# Patient Record
Sex: MALE | Race: WHITE | NOT HISPANIC OR LATINO
[De-identification: names, ages, dates, MRNs, and addresses within clinical notes are randomized per-mention and may not be internally consistent; named-entity substitution may affect disease eponyms.]

---

## 2021-10-27 ENCOUNTER — FORM ENCOUNTER (OUTPATIENT)
Age: 66
End: 2021-10-27

## 2022-04-21 ENCOUNTER — FORM ENCOUNTER (OUTPATIENT)
Age: 67
End: 2022-04-21

## 2022-04-22 VITALS
TEMPERATURE: 97.5 F | HEIGHT: 70 IN | DIASTOLIC BLOOD PRESSURE: 82 MMHG | SYSTOLIC BLOOD PRESSURE: 128 MMHG | BODY MASS INDEX: 32.21 KG/M2 | HEART RATE: 70 BPM | WEIGHT: 225 LBS

## 2022-08-23 ENCOUNTER — NON-APPOINTMENT (OUTPATIENT)
Age: 67
End: 2022-08-23

## 2022-08-23 DIAGNOSIS — Z78.9 OTHER SPECIFIED HEALTH STATUS: ICD-10-CM

## 2022-08-23 DIAGNOSIS — Z85.038 PERSONAL HISTORY OF OTHER MALIGNANT NEOPLASM OF LARGE INTESTINE: ICD-10-CM

## 2022-08-23 DIAGNOSIS — Z80.9 FAMILY HISTORY OF MALIGNANT NEOPLASM, UNSPECIFIED: ICD-10-CM

## 2022-08-23 RX ORDER — VALSARTAN AND HYDROCHLOROTHIAZIDE 320; 25 MG/1; MG/1
320-25 TABLET, FILM COATED ORAL DAILY
Refills: 0 | Status: ACTIVE | COMMUNITY

## 2022-08-23 RX ORDER — ATORVASTATIN CALCIUM 10 MG/1
10 TABLET, FILM COATED ORAL
Refills: 0 | Status: ACTIVE | COMMUNITY

## 2022-10-11 ENCOUNTER — APPOINTMENT (OUTPATIENT)
Dept: CARDIOLOGY | Facility: CLINIC | Age: 67
End: 2022-10-11

## 2022-10-11 VITALS — BODY MASS INDEX: 32.27 KG/M2 | HEIGHT: 70 IN | WEIGHT: 225.44 LBS

## 2022-10-11 PROCEDURE — 99205 OFFICE O/P NEW HI 60 MIN: CPT

## 2022-10-12 ENCOUNTER — NON-APPOINTMENT (OUTPATIENT)
Age: 67
End: 2022-10-12

## 2022-12-27 ENCOUNTER — NON-APPOINTMENT (OUTPATIENT)
Age: 67
End: 2022-12-27

## 2023-02-06 ENCOUNTER — APPOINTMENT (OUTPATIENT)
Dept: CARDIOLOGY | Facility: CLINIC | Age: 68
End: 2023-02-06
Payer: MEDICARE

## 2023-02-06 PROCEDURE — 93925 LOWER EXTREMITY STUDY: CPT

## 2023-02-06 PROCEDURE — 93306 TTE W/DOPPLER COMPLETE: CPT

## 2023-03-13 NOTE — DISCUSSION/SUMMARY
[FreeTextEntry1] : exercise for HDL \par claudication get arterial u/s as mild diminished dps \par get 2 decho \par f/u in 6 months \par get bloodwork

## 2023-03-13 NOTE — HISTORY OF PRESENT ILLNESS
[FreeTextEntry1] : PT WITH HTN, HLD, OBESITY, GRADE 1 DIASTOLIC DYSFUNCTION, ABN STRESS EKG, NORMAL NUCLEAR . \par CUFF ACCURATE WITH OFFICE. \par 21: EXERCISE STRESS NUCLEAR: 2 mm st depression inferolateral only exercised 5:30, HTN WITH EXERCISE. nuclear is negative for ischemia. \par \par T was on diet control from last visit \par \par 10/11/22: pt TG now improved to 132 with diet control but HDL increased, pt denies cp, pt walking with dog, sob only if going up a hill. \par pt complains of pain in calves periodically. pt had flu shot last week with some issues. \par pt HAD COUGH WITH ACE I BUT NO PROBLEMS DIOVAN \par \par 3/13/23: Pt's t from 132. Discussed diet control with patient as patient admits to eating a lot of pasta neelam bread will recheck blood work at next visit.

## 2023-03-13 NOTE — PHYSICAL EXAM
[Normal Venous Pressure] : normal venous pressure [Normal S1, S2] : normal S1, S2 [Clear Lung Fields] : clear lung fields [Soft] : abdomen soft [de-identified] : RRR [de-identified] : DIMINISHED PULSES DP

## 2023-05-22 ENCOUNTER — APPOINTMENT (OUTPATIENT)
Dept: CARDIOLOGY | Facility: CLINIC | Age: 68
End: 2023-05-22
Payer: MEDICARE

## 2023-05-22 VITALS — HEART RATE: 74 BPM | SYSTOLIC BLOOD PRESSURE: 130 MMHG | DIASTOLIC BLOOD PRESSURE: 78 MMHG

## 2023-05-22 VITALS — WEIGHT: 223 LBS | HEIGHT: 70 IN | BODY MASS INDEX: 31.92 KG/M2

## 2023-05-22 PROCEDURE — 99214 OFFICE O/P EST MOD 30 MIN: CPT

## 2023-05-22 NOTE — CARDIOLOGY SUMMARY
[de-identified] : 2/6/23: DD1, EF: 63%, mild MR, aorta 3.7cm [de-identified] : 2/6/23: mild diffuse atherosclerosis noted in b/l le

## 2023-05-22 NOTE — HISTORY OF PRESENT ILLNESS
[FreeTextEntry1] : PT WITH HTN, HLD, OBESITY, GRADE 1 DIASTOLIC DYSFUNCTION, ABN STRESS EKG, NORMAL NUCLEAR . \par CUFF ACCURATE WITH OFFICE. \par 21: EXERCISE STRESS NUCLEAR: 2 mm st depression inferolateral only exercised 5:30, HTN WITH EXERCISE. nuclear is negative for ischemia. \par \par T was on diet control from last visit \par \par 10/11/22: pt TG now improved to 132 with diet control but HDL increased, pt denies cp, pt walking with dog, sob only if going up a hill. \par pt complains of pain in calves periodically. pt had flu shot last week with some issues. \par pt HAD COUGH WITH ACE I BUT NO PROBLEMS DIOVAN \par \par 3/13/23: Pt's t from 132. Discussed diet control with patient as patient admits to eating a lot of pasta neelam bread will recheck blood work at next visit. \par \par 23:\par 23: DD1, EF: 63%, mild MR, aorta 3.7cm\par 23: mild diffuse atherosclerosis noted in b/l le \par pt lost 4 lbs, pt says cut down carbs, pt with mild atherosclerosis, pt had elevated TG and die control. pt has pain in his heel with walking at time, and pt complaints of pain in his back.

## 2023-05-22 NOTE — PHYSICAL EXAM
[Normal Venous Pressure] : normal venous pressure [Normal S1, S2] : normal S1, S2 [Clear Lung Fields] : clear lung fields [Soft] : abdomen soft [de-identified] : RRR [de-identified] : DIMINISHED PULSES DP

## 2023-09-09 ENCOUNTER — EMERGENCY (EMERGENCY)
Facility: HOSPITAL | Age: 68
LOS: 0 days | Discharge: HOME | End: 2023-09-09
Attending: STUDENT IN AN ORGANIZED HEALTH CARE EDUCATION/TRAINING PROGRAM
Payer: MEDICARE

## 2023-09-09 VITALS
RESPIRATION RATE: 18 BRPM | HEART RATE: 82 BPM | SYSTOLIC BLOOD PRESSURE: 195 MMHG | OXYGEN SATURATION: 98 % | DIASTOLIC BLOOD PRESSURE: 93 MMHG | TEMPERATURE: 98 F

## 2023-09-09 DIAGNOSIS — M54.41 LUMBAGO WITH SCIATICA, RIGHT SIDE: ICD-10-CM

## 2023-09-09 DIAGNOSIS — M54.50 LOW BACK PAIN, UNSPECIFIED: ICD-10-CM

## 2023-09-09 PROCEDURE — 96372 THER/PROPH/DIAG INJ SC/IM: CPT

## 2023-09-09 PROCEDURE — 99284 EMERGENCY DEPT VISIT MOD MDM: CPT

## 2023-09-09 PROCEDURE — 99283 EMERGENCY DEPT VISIT LOW MDM: CPT | Mod: 25

## 2023-09-09 RX ORDER — LIDOCAINE 4 G/100G
1 CREAM TOPICAL ONCE
Refills: 0 | Status: COMPLETED | OUTPATIENT
Start: 2023-09-09 | End: 2023-09-09

## 2023-09-09 RX ORDER — LIDOCAINE 4 G/100G
1 CREAM TOPICAL
Qty: 1 | Refills: 0
Start: 2023-09-09 | End: 2023-09-15

## 2023-09-09 RX ORDER — LIDOCAINE 4 G/100G
1 CREAM TOPICAL
Qty: 1 | Refills: 0
Start: 2023-09-09 | End: 2023-09-13

## 2023-09-09 RX ORDER — DEXAMETHASONE 0.5 MG/5ML
10 ELIXIR ORAL ONCE
Refills: 0 | Status: COMPLETED | OUTPATIENT
Start: 2023-09-09 | End: 2023-09-09

## 2023-09-09 RX ORDER — KETOROLAC TROMETHAMINE 30 MG/ML
30 SYRINGE (ML) INJECTION ONCE
Refills: 0 | Status: DISCONTINUED | OUTPATIENT
Start: 2023-09-09 | End: 2023-09-09

## 2023-09-09 RX ADMIN — Medication 10 MILLIGRAM(S): at 11:32

## 2023-09-09 RX ADMIN — LIDOCAINE 1 PATCH: 4 CREAM TOPICAL at 11:28

## 2023-09-09 RX ADMIN — Medication 30 MILLIGRAM(S): at 11:28

## 2023-09-09 NOTE — ED PROVIDER NOTE - CARE PROVIDER_API CALL
Irma Fowler  Neurosurgery  [address] [place] [address], [address] [address]  Phone: [phone]  Fax: [phone]  Follow Up Time: 1-3 Days

## 2023-09-09 NOTE — ED ADULT NURSE NOTE - NSFALLUNIVINTERV_ED_ALL_ED
Bed/Stretcher in lowest position, wheels locked, appropriate side rails in place/Call bell, personal items and telephone in reach/Instruct patient to call for assistance before getting out of bed/chair/stretcher/Non-slip footwear applied when patient is off stretcher/[place] to call system/Physically safe environment - no spills, clutter or unnecessary equipment/Purposeful proactive rounding/Room/bathroom lighting operational, light cord in reach

## 2023-09-09 NOTE — ED PROVIDER NOTE - OBJECTIVE STATEMENT
67 yo male, no PMHx, presenting with right lower back pain xfew days, radiating down RLE, worse with use, no alleviating factors, no associated symptoms. Patient was prescribed tizanidine and naproxen yesterday. He states he took out heavy trash which exacerbated the pain. States he had sciatica in the past in how left lower back and this feels similar. Denies fevers, chills, IVDU, urinary retention/incontinence, nausea, vomiting, headache, dizziness, trauma.

## 2023-09-09 NOTE — ED ADULT TRIAGE NOTE - TEMPERATURE IN CELSIUS (DEGREES C)
[address], [address] [facility] [address]  Dept: [phone]     Date:   4/3/2017    Patient:  Berto Rodriguez  :      3/19/2000  MRN:      [id number]    Chief Complaint: 36.7 Lisdexamfetamine Dimesylate (VYVANSE) 30 MG Oral Cap Take 1 capsule (30 mg total) by mouth every morning.  Disp: 30 capsule Rfl: 0   CLINDAMYCIN PHOSPHATE 1 % External Lotion APPLY 2 TIMES EVERY DAY A THIN FILM TO THE AFFECTED AREA(S) Disp: 60 mL Rfl: 5 breakfast, Eat 3 meals per day, Plan meals in advance, Read nutrition labels, Drink 64 oz of water per day, Maintain a daily food journal, No drinking 30 minutes before or after meals, Utlize portion control strategies to reduce calorie intake, Identify tr regular soda. 3. Increase activity-upper body exercises, walk 10 minutes per day. 4. Increase fruit and vegetable servings to 5-6 per day.       Feels much better with vyvanse  Tolerating well    Started on mini pill by GYN    Blood work done  [email or web address] [email or web address]

## 2023-09-09 NOTE — ED PROVIDER NOTE - PATIENT PORTAL LINK FT
You can access the FollowMyHealth Patient Portal offered by [facility] by registering at the following website: [email or web address]. By joining [email or web address]’s FollowMyHealth portal, you will also be able to view your health information using other applications (apps) compatible with our system.

## 2023-09-09 NOTE — ED PROVIDER NOTE - PHYSICAL EXAMINATION
CONSTITUTIONAL: Well-developed; well-nourished; in no acute distress.   SKIN: warm, dry  HEAD: Normocephalic; atraumatic.  EYES: PERRL, EOMI, no conjunctival erythema  ENT: No nasal discharge; airway clear.  NECK: Supple; non tender.  ABD: soft ntnd  BACK: right paraspinal low back tenderness to palpation. able to ambulate independently without assistance. no midline tenderness. good ROM. no eternal signs of trauma. sensation intact b/l LE equal.  EXT: Normal ROM.  No clubbing, cyanosis or edema.   NEURO: Alert, oriented, grossly unremarkable  PSYCH: Cooperative, appropriate.

## 2023-09-09 NOTE — ED PROVIDER NOTE - NSFOLLOWUPINSTRUCTIONS_ED_ALL_ED_FT
Our Emergency Department Referral Coordinators will be reaching out to you in the next 24-48 hours from 9:00am to 5:00pm with a follow up appointment. Please expect a phone call from the hospital in that time frame. If you do not receive a call or if you have any questions or concerns, you can reach them at   [phone]    Sciatica  Back view of a person showing a normal leg and a leg affected by the pain of sciatica.  Sciatica is pain, numbness, weakness, or tingling along the path of the sciatic nerve. The sciatic nerve starts in the lower back and runs down the back of each leg. The nerve controls the muscles in the lower leg and in the back of the knee. It also provides feeling (sensation) to the back of the thigh, the lower leg, and the sole of the foot. Sciatica is a symptom of another medical condition that pinches or puts pressure on the sciatic nerve.    Sciatica most often only affects one side of the body. Sciatica usually goes away on its own or with treatment. In some cases, sciatica may come back (recur).    What are the causes?  This condition is caused by pressure on the sciatic nerve or pinching of the nerve. This may be the result of:  A disk in between the bones of the spine bulging out too far (herniated disk).  Age-related changes in the spinal disks.  A pain disorder that affects a muscle in the buttock.  Extra bone growth near the sciatic nerve.  A break (fracture) of the pelvis.  Pregnancy.  Tumor. This is rare.  What increases the risk?  The following factors may make you more likely to develop this condition:  Playing sports that place pressure or stress on the spine.  Having poor strength and flexibility.  A history of back injury or surgery.  Sitting for long periods of time.  Doing activities that involve repetitive bending or lifting.  Obesity.  What are the signs or symptoms?  Symptoms can vary from mild to very severe. They may include:  Any of the following problems in the lower back, leg, hip, or buttock:  Mild tingling, numbness, or dull aches.  Burning sensations.  Sharp pains.  Numbness in the back of the calf or the sole of the foot.  Leg weakness.  Severe back pain that makes movement difficult.  Symptoms may get worse when you cough, sneeze, or laugh, or when you sit or stand for long periods of time.    How is this diagnosed?  This condition may be diagnosed based on:  Your symptoms and medical history.  A physical exam.  Blood tests.  Imaging tests, such as:  X-rays.  An MRI.  A CT scan.  How is this treated?  In many cases, this condition improves on its own without treatment. However, treatment may include:  Reducing or modifying physical activity.  Exercising, including strengthening and stretching.  Icing and applying heat to the affected area.  Medicines that help to:  Relieve pain and swelling.  Relax your muscles.  Injections of medicines that help to relieve pain and inflammation (steroids) around the sciatic nerve.  Surgery.  Follow these instructions at home:  Medicines    Take over-the-counter and prescription medicines only as told by your health care provider.  Ask your health care provider if the medicine prescribed to you requires you to avoid driving or using heavy machinery.  Managing pain    Bag of ice on a towel on the skin.  A heating pad for use on the affected area.  If directed, put ice on the affected area. To do this:  Put ice in a plastic bag.  Place a towel between your skin and the bag.  Leave the ice on for 20 minutes, 2–3 times a day.  If your skin turns bright red, remove the ice right away to prevent skin damage. The risk of skin damage is higher if you cannot feel pain, heat, or cold.  If directed, apply heat to the affected area as often as told by your health care provider. Use the heat source that your health care provider recommends, such as a moist heat pack or a heating pad.  Place a towel between your skin and the heat source.  Leave the heat on for 20–30 minutes.  If your skin turns bright red, remove the heat right away to prevent burns. The risk of burns is higher if you cannot feel pain, heat, or cold.  Activity    A comparison showing the right and wrong way to lift a heavy object.  Return to your normal activities as told by your health care provider. Ask your health care provider what activities are safe for you.  Avoid activities that make your symptoms worse.  Take brief periods of rest throughout the day.  When you rest for longer periods, mix in some mild activity or stretching between periods of rest. This will help to prevent stiffness and pain.  Avoid sitting for long periods of time without moving. Get up and move around at least one time each hour.  Exercise and stretch regularly as told by your health care provider.  Do not lift anything that is heavier than 10 lb (4.5 kg) until your health care provider says that it is safe. When you do not have symptoms, you should still avoid heavy lifting, especially repetitive heavy lifting.  When you lift objects, always use proper lifting technique, which includes:  Bending your knees.  Keeping the load close to your body.  Avoiding twisting.  General instructions    Maintain a healthy weight. Excess weight puts extra stress on your back.  Wear supportive, comfortable shoes. Avoid wearing high heels.  Avoid sleeping on a mattress that is too soft or too hard. A mattress that is firm enough to support your back when you sleep may help to reduce your pain.  Contact a health care provider if:  Your pain is not controlled by medicine.  Your pain does not improve or gets worse.  Your pain lasts longer than 4 weeks.  You have unexplained weight loss.  Get help right away if:  You are not able to control when you urinate or have bowel movements (incontinence).  You have:  Weakness in your lower back, pelvis, buttocks, or legs that gets worse.  Redness or swelling of your back.  A burning sensation when you urinate.  Summary  Sciatica is pain, numbness, weakness, or tingling along the path of the sciatic nerve, which may include the lower back, legs, hips, and buttocks.  This condition is caused by pressure on the sciatic nerve or pinching of the nerve.  Treatment often includes rest, exercise, medicines, and applying ice or heat.  This information is not intended to replace advice given to you by your health care provider. Make sure you discuss any questions you have with your health care provider.

## 2023-09-09 NOTE — ED PROVIDER NOTE - CLINICAL SUMMARY MEDICAL DECISION MAKING FREE TEXT BOX
67 yo male, no PMHx, presenting with right lower back pain xfew days, radiating down RLE, worse with use, feels like when he had sciatica in the past in how left lower back and this feels similar. No red flag symptoms. Given medications and effects reassessed. Medication sent to pharmacy. Patient has tizanidine and naproxen. Given strict return precautions and follow up with neurosurg. Patient stable for discharge and comfortable with outpatient follow up.

## 2023-09-25 ENCOUNTER — APPOINTMENT (OUTPATIENT)
Dept: CARDIOLOGY | Facility: CLINIC | Age: 68
End: 2023-09-25
Payer: MEDICARE

## 2023-09-25 VITALS — HEART RATE: 75 BPM | SYSTOLIC BLOOD PRESSURE: 128 MMHG | DIASTOLIC BLOOD PRESSURE: 80 MMHG

## 2023-09-25 VITALS — WEIGHT: 221 LBS | HEIGHT: 70 IN | BODY MASS INDEX: 31.64 KG/M2

## 2023-09-25 DIAGNOSIS — R94.31 ABNORMAL ELECTROCARDIOGRAM [ECG] [EKG]: ICD-10-CM

## 2023-09-25 DIAGNOSIS — I70.219 ATHEROSCLEROSIS OF NATIVE ARTERIES OF EXTREMITIES WITH INTERMITTENT CLAUDICATION, UNSPECIFIED EXTREMITY: ICD-10-CM

## 2023-09-25 PROCEDURE — 99214 OFFICE O/P EST MOD 30 MIN: CPT

## 2023-10-02 ENCOUNTER — RESULT REVIEW (OUTPATIENT)
Age: 68
End: 2023-10-02

## 2023-10-02 ENCOUNTER — OUTPATIENT (OUTPATIENT)
Dept: OUTPATIENT SERVICES | Facility: HOSPITAL | Age: 68
LOS: 1 days | End: 2023-10-02
Payer: MEDICARE

## 2023-10-02 ENCOUNTER — APPOINTMENT (OUTPATIENT)
Dept: NEUROSURGERY | Facility: CLINIC | Age: 68
End: 2023-10-02
Payer: MEDICARE

## 2023-10-02 VITALS — BODY MASS INDEX: 31.64 KG/M2 | HEIGHT: 70 IN | WEIGHT: 221 LBS

## 2023-10-02 DIAGNOSIS — M54.16 RADICULOPATHY, LUMBAR REGION: ICD-10-CM

## 2023-10-02 DIAGNOSIS — Z83.3 FAMILY HISTORY OF DIABETES MELLITUS: ICD-10-CM

## 2023-10-02 DIAGNOSIS — M54.50 LOW BACK PAIN, UNSPECIFIED: ICD-10-CM

## 2023-10-02 DIAGNOSIS — Z82.61 FAMILY HISTORY OF ARTHRITIS: ICD-10-CM

## 2023-10-02 PROCEDURE — 99204 OFFICE O/P NEW MOD 45 MIN: CPT

## 2023-10-02 PROCEDURE — 72100 X-RAY EXAM L-S SPINE 2/3 VWS: CPT

## 2023-10-02 PROCEDURE — 72100 X-RAY EXAM L-S SPINE 2/3 VWS: CPT | Mod: 26

## 2023-10-02 RX ORDER — TIZANIDINE 2 MG/1
2 TABLET ORAL
Qty: 90 | Refills: 0 | Status: ACTIVE | COMMUNITY
Start: 2023-10-02 | End: 1900-01-01

## 2023-10-02 RX ORDER — NAPROXEN 500 MG/1
500 TABLET ORAL
Refills: 0 | Status: ACTIVE | COMMUNITY

## 2023-10-02 RX ORDER — TIZANIDINE HYDROCHLORIDE 6 MG/1
CAPSULE ORAL
Refills: 0 | Status: ACTIVE | COMMUNITY

## 2023-10-02 RX ORDER — NAPROXEN 500 MG/1
500 TABLET ORAL DAILY
Qty: 30 | Refills: 2 | Status: ACTIVE | COMMUNITY
Start: 2023-10-02 | End: 1900-01-01

## 2023-10-03 DIAGNOSIS — M54.16 RADICULOPATHY, LUMBAR REGION: ICD-10-CM

## 2023-11-06 ENCOUNTER — APPOINTMENT (OUTPATIENT)
Dept: NEUROSURGERY | Facility: CLINIC | Age: 68
End: 2023-11-06
Payer: MEDICARE

## 2023-11-06 VITALS — HEIGHT: 70 IN | BODY MASS INDEX: 31.64 KG/M2 | WEIGHT: 221 LBS

## 2023-11-06 DIAGNOSIS — M43.17 SPONDYLOLISTHESIS, LUMBOSACRAL REGION: ICD-10-CM

## 2023-11-06 DIAGNOSIS — M43.00 SPONDYLOLYSIS, SITE UNSPECIFIED: ICD-10-CM

## 2023-11-06 PROCEDURE — 99214 OFFICE O/P EST MOD 30 MIN: CPT

## 2024-02-16 ENCOUNTER — APPOINTMENT (OUTPATIENT)
Dept: CARDIOLOGY | Facility: CLINIC | Age: 69
End: 2024-02-16
Payer: MEDICARE

## 2024-02-16 PROCEDURE — 93306 TTE W/DOPPLER COMPLETE: CPT

## 2024-03-25 ENCOUNTER — APPOINTMENT (OUTPATIENT)
Dept: CARDIOLOGY | Facility: CLINIC | Age: 69
End: 2024-03-25
Payer: MEDICARE

## 2024-03-25 VITALS — HEIGHT: 70 IN | BODY MASS INDEX: 31.78 KG/M2 | WEIGHT: 222 LBS

## 2024-03-25 VITALS — SYSTOLIC BLOOD PRESSURE: 136 MMHG | DIASTOLIC BLOOD PRESSURE: 82 MMHG | HEART RATE: 75 BPM

## 2024-03-25 DIAGNOSIS — I73.9 PERIPHERAL VASCULAR DISEASE, UNSPECIFIED: ICD-10-CM

## 2024-03-25 DIAGNOSIS — I10 ESSENTIAL (PRIMARY) HYPERTENSION: ICD-10-CM

## 2024-03-25 DIAGNOSIS — Z00.00 ENCOUNTER FOR GENERAL ADULT MEDICAL EXAMINATION W/OUT ABNORMAL FINDINGS: ICD-10-CM

## 2024-03-25 DIAGNOSIS — R73.03 PREDIABETES.: ICD-10-CM

## 2024-03-25 DIAGNOSIS — E66.09 OTHER OBESITY DUE TO EXCESS CALORIES: ICD-10-CM

## 2024-03-25 DIAGNOSIS — E78.2 MIXED HYPERLIPIDEMIA: ICD-10-CM

## 2024-03-25 DIAGNOSIS — I77.810 THORACIC AORTIC ECTASIA: ICD-10-CM

## 2024-03-25 DIAGNOSIS — R06.02 SHORTNESS OF BREATH: ICD-10-CM

## 2024-03-25 DIAGNOSIS — R94.39 ABNORMAL RESULT OF OTHER CARDIOVASCULAR FUNCTION STUDY: ICD-10-CM

## 2024-03-25 PROCEDURE — 93000 ELECTROCARDIOGRAM COMPLETE: CPT

## 2024-03-25 PROCEDURE — 99214 OFFICE O/P EST MOD 30 MIN: CPT | Mod: 25

## 2024-03-25 NOTE — PHYSICAL EXAM
[Normal Venous Pressure] : normal venous pressure [Normal S1, S2] : normal S1, S2 [Clear Lung Fields] : clear lung fields [Soft] : abdomen soft [de-identified] : RRR [de-identified] : DIMINISHED PULSES DP

## 2024-03-25 NOTE — HISTORY OF PRESENT ILLNESS
[FreeTextEntry1] : PT WITH HTN, HLD, OBESITY, GRADE 1 DIASTOLIC DYSFUNCTION, ABN STRESS EKG, NORMAL NUCLEAR .  DILATED AORTA 3.7 cm, MILD PVDZ , prediabetes   CUFF ACCURATE WITH OFFICE.   21: EXERCISE STRESS NUCLEAR: 2 mm st depression inferolateral only exercised 5:30, HTN WITH EXERCISE. nuclear is negative for ischemia.  pt HAD COUGH WITH ACE I BUT NO PROBLEMS DIOVAN   3/13/23: Pt's t from 132. Discussed diet control with patient as patient admits to eating a lot of pasta and bread will recheck blood work at next visit.   23: 23: DD1, EF: 63%, mild MR, aorta 3.7cm 23: mild diffuse atherosclerosis noted in b/l le  pt lost 4 lbs, pt says cut down carbs, pt with mild atherosclerosis, pt had elevated TG on diet control. pt has pain in his heel with walking at time, and pt complaints of pain in his back.   23:  HDL: 47, T (PT DID NOT FAST), LDL: 88, NTBNP: 62 Patient denies cp, sob, dizziness, syncope or palpitations. Patient states he gained a few lbs but walks dog daily about 1-1.5 mild in [place] [employer] [place]. Patient gets sob with hill. Patient has bad back.   3/25/24: 24: EF: 62%, DD1, mild TR, Asc Ao: 3.7 cm, DD1, e' sept: 0.11 m/s LVOT VTI: 18.1 cm.  3/24: A1c: 5.8 bnp: pending. rest of bloodwork pending.  pt with bad back but is improved vs last visit, pt now prediabetic. pt does walk but does not walk. pt sob with heavy walking.

## 2024-03-25 NOTE — CARDIOLOGY SUMMARY
[de-identified] : 2/6/23: DD1, EF: 63%, mild MR, aorta 3.7cm [de-identified] : 2/6/23: mild diffuse atherosclerosis noted in b/l le

## 2024-03-25 NOTE — DISCUSSION/SUMMARY
[FreeTextEntry1] : exercise for HDL  mild atherosclerosis peripheral  continue valsartan/hct 320/25  continue atorvastatin 10 mg po qhs  f/u TG elevated in past.  f/u in 4months, diet cotnrol for prediabetes.  sob will get CTA as abnormal ekg portion stress in past f/u in 4 months bloodwork

## 2024-06-27 NOTE — ED ADULT TRIAGE NOTE - INTERNATIONAL TRAVEL
"SW met with Pt and Pt's wife, Abdelrahman in exam room for psychosocial update. Pt was pleasant and engaged. Pt has [employer] for insurance. Pt reported he was hospitalized this month from June 4th until June 14th due to a piece of metal being stuck in Pt's foot and causing an infection. Pt reported during this hospitalization, he had his 4th toe amputated. Pt reported good compliance with his medications, medical appointments, and dialysis treatments. Pt reported he will take a \"mental health day\" from dialysis every \"7-8 months\" and miss a treatment. Pts primary support is his wife, Abdelrahman ([phone]). Pt secondary support is his daughter, Jenny ([phone]). Pt shared both supports work FT and can take time off from work and they drive and have working vehicles. Pt described their mood as \"I've been feeling A-ok.\" Pt denied any recent MH concerns/diagnosis. Pt denied any current MH treatment. Pt shared he enjoys fishing, traveling, and playing pool. Pt scored a 0 on the PHQ-9, indicating no clinical depression. Pt scored a 2 on the ARACELY-7, indicating minimal clinical anxiety. Pt denied any current substance use. SW discussed the inpatient transplant stay and the need for support to be a part of education session. SW discussed the potential need for dialysis after transplant. SW also discussed the frequency of post op appointments - once a week surgeon/nephrologist visits and twice a week labs. Pt expressed understanding. Pt is low psychosocial risk. SW will follow up with Pt annually.     " No

## 2024-07-26 ENCOUNTER — APPOINTMENT (OUTPATIENT)
Dept: CARDIOLOGY | Facility: CLINIC | Age: 69
End: 2024-07-26
Payer: MEDICARE

## 2024-07-26 VITALS — WEIGHT: 218 LBS | HEIGHT: 70 IN | BODY MASS INDEX: 31.21 KG/M2

## 2024-07-26 VITALS — DIASTOLIC BLOOD PRESSURE: 80 MMHG | HEART RATE: 74 BPM | SYSTOLIC BLOOD PRESSURE: 138 MMHG

## 2024-07-26 DIAGNOSIS — I10 ESSENTIAL (PRIMARY) HYPERTENSION: ICD-10-CM

## 2024-07-26 DIAGNOSIS — R94.39 ABNORMAL RESULT OF OTHER CARDIOVASCULAR FUNCTION STUDY: ICD-10-CM

## 2024-07-26 DIAGNOSIS — I70.219 ATHEROSCLEROSIS OF NATIVE ARTERIES OF EXTREMITIES WITH INTERMITTENT CLAUDICATION, UNSPECIFIED EXTREMITY: ICD-10-CM

## 2024-07-26 DIAGNOSIS — I77.810 THORACIC AORTIC ECTASIA: ICD-10-CM

## 2024-07-26 DIAGNOSIS — I73.9 PERIPHERAL VASCULAR DISEASE, UNSPECIFIED: ICD-10-CM

## 2024-07-26 DIAGNOSIS — E78.2 MIXED HYPERLIPIDEMIA: ICD-10-CM

## 2024-07-26 DIAGNOSIS — R73.03 PREDIABETES.: ICD-10-CM

## 2024-07-26 PROCEDURE — 99214 OFFICE O/P EST MOD 30 MIN: CPT

## 2024-07-26 PROCEDURE — G2211 COMPLEX E/M VISIT ADD ON: CPT

## 2024-07-26 RX ORDER — METOPROLOL TARTRATE 100 MG/1
100 TABLET, FILM COATED ORAL
Qty: 2 | Refills: 0 | Status: ACTIVE | COMMUNITY
Start: 2024-07-26 | End: 1900-01-01

## 2024-07-29 LAB — POTASSIUM SERPL-SCNC: 4.4 MMOL/L

## 2024-07-29 NOTE — CARDIOLOGY SUMMARY
[de-identified] : 2/6/23: DD1, EF: 63%, mild MR, aorta 3.7cm [de-identified] : 2/6/23: mild diffuse atherosclerosis noted in b/l le

## 2024-07-29 NOTE — DISCUSSION/SUMMARY
[FreeTextEntry1] : exercise for HDL  mild atherosclerosis peripheral  continue valsartan/hct 320/25  continue atorvastatin 10 mg po qhs  TG controlled on diet  diet control for prediabetes.  sob will get CTA as abnormal ekg portion of stress in past f/u in 4 months bloodwork

## 2024-07-29 NOTE — PHYSICAL EXAM
[Normal Venous Pressure] : normal venous pressure [Normal S1, S2] : normal S1, S2 [Clear Lung Fields] : clear lung fields [Soft] : abdomen soft [de-identified] : RRR [de-identified] : DIMINISHED PULSES DP

## 2024-07-29 NOTE — CARDIOLOGY SUMMARY
[de-identified] : 2/6/23: DD1, EF: 63%, mild MR, aorta 3.7cm [de-identified] : 2/6/23: mild diffuse atherosclerosis noted in b/l le

## 2024-07-29 NOTE — HISTORY OF PRESENT ILLNESS
[FreeTextEntry1] : PT WITH HTN, HLD, OBESITY, GRADE 1 DIASTOLIC DYSFUNCTION, ABN STRESS EKG, NORMAL NUCLEAR .  DILATED AORTA 3.7 cm, MILD PVDZ , prediabetes   CUFF ACCURATE WITH OFFICE.   21: EXERCISE STRESS NUCLEAR: 2 mm st depression inferolateral only exercised 5:30, HTN WITH EXERCISE. nuclear is negative for ischemia.  pt HAD COUGH WITH ACE I BUT NO PROBLEMS DIOVAN   3/13/23: Pt's t from 132. Discussed diet control with patient as patient admits to eating a lot of pasta and bread will recheck blood work at next visit.   23: 23: DD1, EF: 63%, mild MR, aorta 3.7cm 23: mild diffuse atherosclerosis noted in b/l le  pt lost 4 lbs, pt says cut down carbs, pt with mild atherosclerosis, pt had elevated TG on diet control. pt has pain in his heel with walking at time, and pt complaints of pain in his back.   23:  HDL: 47, T (PT DID NOT FAST), LDL: 88, NTBNP: 62 Patient denies cp, sob, dizziness, syncope or palpitations. Patient states he gained a few lbs but walks dog daily about 1-1.5 mild in [place]. Patient gets sob with hill. Patient has bad back.   3/25/24: 24: EF: 62%, DD1, mild TR, Asc Ao: 3.7 cm, DD1, e' sept: 0.11 m/s LVOT VTI: 18.1 cm.  3/24: A1c: 5.8  pt with bad back but is improved vs last visit, pt now prediabetic. pt does walk but does not walk. pt sob with heavy walking.   24:  24: HDL: 38 decreased, T, LDL: 67, K: 5.4 elevated, A1C: 5.7 improved, BNP: 68 pt still intermittent sob with walking fast but not walking as much as he usually does.  pt HDL lower, pt potassium 4.4 on repeat today.

## 2024-07-29 NOTE — CARDIOLOGY SUMMARY
[de-identified] : 2/6/23: DD1, EF: 63%, mild MR, aorta 3.7cm [de-identified] : 2/6/23: mild diffuse atherosclerosis noted in b/l le

## 2024-07-29 NOTE — HISTORY OF PRESENT ILLNESS
[FreeTextEntry1] : PT WITH HTN, HLD, OBESITY, GRADE 1 DIASTOLIC DYSFUNCTION, ABN STRESS EKG, NORMAL NUCLEAR .  DILATED AORTA 3.7 cm, MILD PVDZ , prediabetes   CUFF ACCURATE WITH OFFICE.   21: EXERCISE STRESS NUCLEAR: 2 mm st depression inferolateral only exercised 5:30, HTN WITH EXERCISE. nuclear is negative for ischemia.  pt HAD COUGH WITH ACE I BUT NO PROBLEMS DIOVAN   3/13/23: Pt's t from 132. Discussed diet control with patient as patient admits to eating a lot of pasta and bread will recheck blood work at next visit.   23: 23: DD1, EF: 63%, mild MR, aorta 3.7cm 23: mild diffuse atherosclerosis noted in b/l le  pt lost 4 lbs, pt says cut down carbs, pt with mild atherosclerosis, pt had elevated TG on diet control. pt has pain in his heel with walking at time, and pt complaints of pain in his back.   23:  HDL: 47, T (PT DID NOT FAST), LDL: 88, NTBNP: 62 Patient denies cp, sob, dizziness, syncope or palpitations. Patient states he gained a few lbs but walks dog daily about 1-1.5 mild in [place]. Patient gets sob with hill. Patient has bad back.   3/25/24: 24: EF: 62%, DD1, mild TR, Asc Ao: 3.7 cm, DD1, e' sept: 0.11 m/s LVOT VTI: 18.1 cm.  3/24: A1c: 5.8  pt with bad back but is improved vs last visit, pt now prediabetic. pt does walk but does not walk. pt sob with heavy walking.   24:  24: HDL: 38 decreased, T, LDL: 67, K: 5.4 elevated, A1C: 5.7 improved, BNP: 68 pt still intermittent sob with walking fast but not walking as much as he usually does.  pt HDL lower, pt potassium 4.4 on repeat today.

## 2024-07-29 NOTE — PHYSICAL EXAM
[Normal Venous Pressure] : normal venous pressure [Normal S1, S2] : normal S1, S2 [Clear Lung Fields] : clear lung fields [Soft] : abdomen soft [de-identified] : RRR [de-identified] : DIMINISHED PULSES DP

## 2024-07-29 NOTE — HISTORY OF PRESENT ILLNESS
[FreeTextEntry1] : PT WITH HTN, HLD, OBESITY, GRADE 1 DIASTOLIC DYSFUNCTION, ABN STRESS EKG, NORMAL NUCLEAR .  DILATED AORTA 3.7 cm, MILD PVDZ , prediabetes   CUFF ACCURATE WITH OFFICE.   21: EXERCISE STRESS NUCLEAR: 2 mm st depression inferolateral only exercised 5:30, HTN WITH EXERCISE. nuclear is negative for ischemia.  pt HAD COUGH WITH ACE I BUT NO PROBLEMS DIOVAN   3/13/23: Pt's t from 132. Discussed diet control with patient as patient admits to eating a lot of pasta and bread will recheck blood work at next visit.   23: 23: DD1, EF: 63%, mild MR, aorta 3.7cm 23: mild diffuse atherosclerosis noted in b/l le  pt lost 4 lbs, pt says cut down carbs, pt with mild atherosclerosis, pt had elevated TG on diet control. pt has pain in his heel with walking at time, and pt complaints of pain in his back.   23:  HDL: 47, T (PT DID NOT FAST), LDL: 88, NTBNP: 62 Patient denies cp, sob, dizziness, syncope or palpitations. Patient states he gained a few lbs but walks dog daily about 1-1.5 mild in [place]. Patient gets sob with hill. Patient has bad back.   3/25/24: 24: EF: 62%, DD1, mild TR, Asc Ao: 3.7 cm, DD1, e' sept: 0.11 m/s LVOT VTI: 18.1 cm.  3/24: A1c: 5.8  pt with bad back but is improved vs last visit, pt now prediabetic. pt does walk but does not walk. pt sob with heavy walking.   24:  24: HDL: 38 decreased, T, LDL: 67, K: 5.4 elevated, A1C: 5.7 improved, BNP: 68 pt still intermittent sob with walking fast but not walking as much as he usually does.  pt HDL lower, pt potassium 4.4 on repeat today.

## 2024-07-29 NOTE — CARDIOLOGY SUMMARY
[de-identified] : 2/6/23: DD1, EF: 63%, mild MR, aorta 3.7cm [de-identified] : 2/6/23: mild diffuse atherosclerosis noted in b/l le

## 2024-07-29 NOTE — PHYSICAL EXAM
[Normal Venous Pressure] : normal venous pressure [Normal S1, S2] : normal S1, S2 [Clear Lung Fields] : clear lung fields [Soft] : abdomen soft [de-identified] : RRR [de-identified] : DIMINISHED PULSES DP

## 2024-07-29 NOTE — HISTORY OF PRESENT ILLNESS
[FreeTextEntry1] : PT WITH HTN, HLD, OBESITY, GRADE 1 DIASTOLIC DYSFUNCTION, ABN STRESS EKG, NORMAL NUCLEAR .  DILATED AORTA 3.7 cm, MILD PVDZ , prediabetes   CUFF ACCURATE WITH OFFICE.   21: EXERCISE STRESS NUCLEAR: 2 mm st depression inferolateral only exercised 5:30, HTN WITH EXERCISE. nuclear is negative for ischemia.  pt HAD COUGH WITH ACE I BUT NO PROBLEMS DIOVAN   3/13/23: Pt's t from 132. Discussed diet control with patient as patient admits to eating a lot of pasta and bread will recheck blood work at next visit.   23: 23: DD1, EF: 63%, mild MR, aorta 3.7cm 23: mild diffuse atherosclerosis noted in b/l le  pt lost 4 lbs, pt says cut down carbs, pt with mild atherosclerosis, pt had elevated TG on diet control. pt has pain in his heel with walking at time, and pt complaints of pain in his back.   23:  HDL: 47, T (PT DID NOT FAST), LDL: 88, NTBNP: 62 Patient denies cp, sob, dizziness, syncope or palpitations. Patient states he gained a few lbs but walks dog daily about 1-1.5 mild in [place]. Patient gets sob with hill. Patient has bad back.   3/25/24: 24: EF: 62%, DD1, mild TR, Asc Ao: 3.7 cm, DD1, e' sept: 0.11 m/s LVOT VTI: 18.1 cm.  3/24: A1c: 5.8  pt with bad back but is improved vs last visit, pt now prediabetic. pt does walk but does not walk. pt sob with heavy walking.   24:  24: HDL: 38 decreased, T, LDL: 67, K: 5.4 elevated, A1C: 5.7 improved, BNP: 68 pt still intermittent sob with walking fast but not walking as much as he usually does.  pt HDL lower, pt potassium 4.4 on repeat today.

## 2024-07-29 NOTE — PHYSICAL EXAM
[Normal Venous Pressure] : normal venous pressure [Normal S1, S2] : normal S1, S2 [Clear Lung Fields] : clear lung fields [Soft] : abdomen soft [de-identified] : RRR [de-identified] : DIMINISHED PULSES DP

## 2024-09-04 ENCOUNTER — OUTPATIENT (OUTPATIENT)
Dept: OUTPATIENT SERVICES | Facility: HOSPITAL | Age: 69
LOS: 1 days | End: 2024-09-04
Payer: MEDICARE

## 2024-09-04 DIAGNOSIS — Z00.8 ENCOUNTER FOR OTHER GENERAL EXAMINATION: ICD-10-CM

## 2024-09-04 DIAGNOSIS — R94.31 ABNORMAL ELECTROCARDIOGRAM [ECG] [EKG]: ICD-10-CM

## 2024-09-04 PROCEDURE — 75574 CT ANGIO HRT W/3D IMAGE: CPT | Mod: 26

## 2024-09-04 PROCEDURE — 75574 CT ANGIO HRT W/3D IMAGE: CPT

## 2024-09-05 DIAGNOSIS — R94.31 ABNORMAL ELECTROCARDIOGRAM [ECG] [EKG]: ICD-10-CM

## 2024-09-17 ENCOUNTER — OUTPATIENT (OUTPATIENT)
Dept: OUTPATIENT SERVICES | Facility: HOSPITAL | Age: 69
LOS: 1 days | End: 2024-09-17
Payer: MEDICARE

## 2024-09-17 ENCOUNTER — RESULT REVIEW (OUTPATIENT)
Age: 69
End: 2024-09-17

## 2024-09-17 DIAGNOSIS — R94.31 ABNORMAL ELECTROCARDIOGRAM [ECG] [EKG]: ICD-10-CM

## 2024-09-17 PROCEDURE — 75580 N-INVAS EST C FFR SW ALY CTA: CPT | Mod: 26

## 2024-09-17 PROCEDURE — 75580 N-INVAS EST C FFR SW ALY CTA: CPT

## 2024-09-18 DIAGNOSIS — R94.31 ABNORMAL ELECTROCARDIOGRAM [ECG] [EKG]: ICD-10-CM

## 2024-12-12 PROBLEM — I25.10 CORONARY ARTERY STENOSIS: Status: ACTIVE | Noted: 2024-12-12

## 2024-12-12 PROBLEM — R93.1 ELEVATED CORONARY ARTERY CALCIUM SCORE: Status: ACTIVE | Noted: 2024-12-12

## 2024-12-13 ENCOUNTER — APPOINTMENT (OUTPATIENT)
Dept: CARDIOLOGY | Facility: CLINIC | Age: 69
End: 2024-12-13
Payer: MEDICARE

## 2024-12-13 VITALS
DIASTOLIC BLOOD PRESSURE: 74 MMHG | OXYGEN SATURATION: 98 % | BODY MASS INDEX: 31.64 KG/M2 | SYSTOLIC BLOOD PRESSURE: 124 MMHG | WEIGHT: 221 LBS | HEART RATE: 104 BPM | HEIGHT: 70 IN

## 2024-12-13 DIAGNOSIS — I73.9 PERIPHERAL VASCULAR DISEASE, UNSPECIFIED: ICD-10-CM

## 2024-12-13 DIAGNOSIS — R93.1 ABNORMAL FINDINGS ON DIAGNOSTIC IMAGING OF HEART AND CORONARY CIRCULATION: ICD-10-CM

## 2024-12-13 DIAGNOSIS — I25.10 ATHEROSCLEROTIC HEART DISEASE OF NATIVE CORONARY ARTERY W/OUT ANGINA PECTORIS: ICD-10-CM

## 2024-12-13 DIAGNOSIS — R73.03 PREDIABETES.: ICD-10-CM

## 2024-12-13 DIAGNOSIS — R06.02 SHORTNESS OF BREATH: ICD-10-CM

## 2024-12-13 DIAGNOSIS — I77.810 THORACIC AORTIC ECTASIA: ICD-10-CM

## 2024-12-13 DIAGNOSIS — R94.31 ABNORMAL ELECTROCARDIOGRAM [ECG] [EKG]: ICD-10-CM

## 2024-12-13 DIAGNOSIS — E78.2 MIXED HYPERLIPIDEMIA: ICD-10-CM

## 2024-12-13 DIAGNOSIS — I10 ESSENTIAL (PRIMARY) HYPERTENSION: ICD-10-CM

## 2024-12-13 PROCEDURE — 99214 OFFICE O/P EST MOD 30 MIN: CPT

## 2024-12-13 PROCEDURE — G2211 COMPLEX E/M VISIT ADD ON: CPT

## 2025-01-22 ENCOUNTER — TRANSCRIPTION ENCOUNTER (OUTPATIENT)
Age: 70
End: 2025-01-22

## 2025-02-05 ENCOUNTER — APPOINTMENT (OUTPATIENT)
Dept: ORTHOPEDIC SURGERY | Facility: CLINIC | Age: 70
End: 2025-02-05

## 2025-02-12 ENCOUNTER — APPOINTMENT (OUTPATIENT)
Dept: PULMONOLOGY | Facility: CLINIC | Age: 70
End: 2025-02-12

## 2025-02-19 ENCOUNTER — APPOINTMENT (OUTPATIENT)
Dept: ORTHOPEDIC SURGERY | Facility: CLINIC | Age: 70
End: 2025-02-19

## 2025-04-17 ENCOUNTER — APPOINTMENT (OUTPATIENT)
Dept: CARDIOLOGY | Facility: CLINIC | Age: 70
End: 2025-04-17
Payer: MEDICARE

## 2025-04-17 PROCEDURE — 93306 TTE W/DOPPLER COMPLETE: CPT

## 2025-04-25 ENCOUNTER — NON-APPOINTMENT (OUTPATIENT)
Age: 70
End: 2025-04-25

## 2025-04-25 ENCOUNTER — APPOINTMENT (OUTPATIENT)
Dept: CARDIOLOGY | Facility: CLINIC | Age: 70
End: 2025-04-25
Payer: MEDICARE

## 2025-04-25 VITALS
BODY MASS INDEX: 31.5 KG/M2 | RESPIRATION RATE: 14 BRPM | OXYGEN SATURATION: 97 % | WEIGHT: 220 LBS | HEIGHT: 70 IN | SYSTOLIC BLOOD PRESSURE: 120 MMHG | HEART RATE: 87 BPM | DIASTOLIC BLOOD PRESSURE: 70 MMHG

## 2025-04-25 DIAGNOSIS — I25.10 ATHEROSCLEROTIC HEART DISEASE OF NATIVE CORONARY ARTERY W/OUT ANGINA PECTORIS: ICD-10-CM

## 2025-04-25 DIAGNOSIS — R73.03 PREDIABETES.: ICD-10-CM

## 2025-04-25 DIAGNOSIS — R93.1 ABNORMAL FINDINGS ON DIAGNOSTIC IMAGING OF HEART AND CORONARY CIRCULATION: ICD-10-CM

## 2025-04-25 DIAGNOSIS — R94.31 ABNORMAL ELECTROCARDIOGRAM [ECG] [EKG]: ICD-10-CM

## 2025-04-25 DIAGNOSIS — E78.2 MIXED HYPERLIPIDEMIA: ICD-10-CM

## 2025-04-25 DIAGNOSIS — I10 ESSENTIAL (PRIMARY) HYPERTENSION: ICD-10-CM

## 2025-04-25 DIAGNOSIS — I77.810 THORACIC AORTIC ECTASIA: ICD-10-CM

## 2025-04-25 PROCEDURE — G2211 COMPLEX E/M VISIT ADD ON: CPT

## 2025-04-25 PROCEDURE — 99214 OFFICE O/P EST MOD 30 MIN: CPT

## 2025-04-25 PROCEDURE — 93000 ELECTROCARDIOGRAM COMPLETE: CPT

## 2025-09-08 ENCOUNTER — APPOINTMENT (OUTPATIENT)
Dept: CARDIOLOGY | Facility: CLINIC | Age: 70
End: 2025-09-08
Payer: MEDICARE

## 2025-09-08 ENCOUNTER — APPOINTMENT (OUTPATIENT)
Facility: CLINIC | Age: 70
End: 2025-09-08
Payer: MEDICARE

## 2025-09-08 VITALS
HEIGHT: 70 IN | WEIGHT: 214 LBS | SYSTOLIC BLOOD PRESSURE: 118 MMHG | DIASTOLIC BLOOD PRESSURE: 80 MMHG | HEART RATE: 91 BPM | OXYGEN SATURATION: 97 % | BODY MASS INDEX: 30.64 KG/M2

## 2025-09-08 DIAGNOSIS — E78.2 MIXED HYPERLIPIDEMIA: ICD-10-CM

## 2025-09-08 DIAGNOSIS — R73.03 PREDIABETES.: ICD-10-CM

## 2025-09-08 DIAGNOSIS — I73.9 PERIPHERAL VASCULAR DISEASE, UNSPECIFIED: ICD-10-CM

## 2025-09-08 DIAGNOSIS — R91.8 OTHER NONSPECIFIC ABNORMAL FINDING OF LUNG FIELD: ICD-10-CM

## 2025-09-08 DIAGNOSIS — R93.1 ABNORMAL FINDINGS ON DIAGNOSTIC IMAGING OF HEART AND CORONARY CIRCULATION: ICD-10-CM

## 2025-09-08 DIAGNOSIS — I25.10 ATHEROSCLEROTIC HEART DISEASE OF NATIVE CORONARY ARTERY W/OUT ANGINA PECTORIS: ICD-10-CM

## 2025-09-08 DIAGNOSIS — I10 ESSENTIAL (PRIMARY) HYPERTENSION: ICD-10-CM

## 2025-09-08 DIAGNOSIS — R06.02 SHORTNESS OF BREATH: ICD-10-CM

## 2025-09-08 DIAGNOSIS — I77.810 THORACIC AORTIC ECTASIA: ICD-10-CM

## 2025-09-08 PROCEDURE — 99214 OFFICE O/P EST MOD 30 MIN: CPT

## 2025-09-08 PROCEDURE — 99204 OFFICE O/P NEW MOD 45 MIN: CPT

## 2025-09-08 PROCEDURE — 93000 ELECTROCARDIOGRAM COMPLETE: CPT

## 2025-09-08 PROCEDURE — G2211 COMPLEX E/M VISIT ADD ON: CPT

## 2025-09-17 ENCOUNTER — APPOINTMENT (OUTPATIENT)
Dept: CARDIOLOGY | Facility: CLINIC | Age: 70
End: 2025-09-17
Payer: MEDICARE

## 2025-09-17 PROCEDURE — 93880 EXTRACRANIAL BILAT STUDY: CPT
